# Patient Record
(demographics unavailable — no encounter records)

---

## 2025-03-25 NOTE — HISTORY OF PRESENT ILLNESS
[___ Month(s) Ago] : [unfilled] month(s) ago [FreeTextEntry1] : 3/2025: patient has no more fevers. initial flareup of joint pains is now improved; he has been on prednisone taper since hospital stay- currently on prednisone 40 mg QD. Ongoing pain in right hip and lower back. Has been off immunotherapy- states heme/onc will plan to resume once prednisone dose is 20 mg QD or below.

## 2025-03-25 NOTE — ASSESSMENT
[FreeTextEntry1] : 66y Male with hx HLD, Hypothyroidism, GERD,  with significant PMH of right renal carcinoma s/p robot-assisted right radical nephrectomy with bony metastatic disease, here for hospital followup.  Based on workup from inpatient stay in 2/2025, I do not suspect inflammatory arthritis such as RA; suspect joint pains both from bone metastases and from immunotherapy. will repeat labs today including ESR, CRP, ferritin which were markedly elevated;  there was a concern for HLH while inpatient- will check fibrinogen, triglycerides, Rafael-2A although suspicion is not high- patient did not have cytopenias, hepato/splenomegaly or significant transaminitis in the hospital.   Plan: Taper prednisone to 30 mg QD x 1 week, then 20 mg QD x 1 week. Per patient, hematology has immunotherapy on hold until prednisone reduced to 20 mg QD- He will follow up with me in 1-2 weeks to discuss results and see how he is feeling on lower steroid dose.

## 2025-03-25 NOTE — REASON FOR VISIT
[Post Hospitalization] : a post hospitalization visit [Follow-Up: _____] : a [unfilled] follow-up visit [FreeTextEntry1] : inflammation, bone/joint pain

## 2025-03-25 NOTE — DATA REVIEWED
[FreeTextEntry1] : CT abdomen/pelvis 2/2025 (hospitalization)- CT abd/pelvis: IMPRESSION: Increased size of retrocaval lymph node abutting the nephrectomy sutures and inferior vena cava, concerning for recurrent/metastatic disease. Hypodensity within the proximal inferior vena cava, favored to reflect mixing artifact, however local invasion cannot be excluded. Recommend further evaluation with contrast-enhanced MRI.   RUQ US: IMPRESSION: Limited evaluation due to overlying gas. No sonographic evidence of cholecystitis. Status post right nephrectomy.

## 2025-03-25 NOTE — REVIEW OF SYSTEMS
[As Noted in HPI] : as noted in HPI [Arthralgias] : arthralgias [Joint Swelling] : no joint swelling [Negative] : Heme/Lymph

## 2025-03-25 NOTE — PHYSICAL EXAM
[General Appearance - Alert] : alert [General Appearance - In No Acute Distress] : in no acute distress [General Appearance - Well Developed] : well developed [Sclera] : the sclera and conjunctiva were normal [Extraocular Movements] : extraocular movements were intact [Outer Ear] : the ears and nose were normal in appearance [Neck Appearance] : the appearance of the neck was normal [] : no respiratory distress [Exaggerated Use Of Accessory Muscles For Inspiration] : no accessory muscle use [Edema] : there was no peripheral edema [Musculoskeletal - Swelling] : no joint swelling seen [Skin Color & Pigmentation] : normal skin color and pigmentation [No Focal Deficits] : no focal deficits [Oriented To Time, Place, And Person] : oriented to person, place, and time [Mood] : the mood was normal [Affect] : the affect was normal

## 2025-04-01 NOTE — HISTORY OF PRESENT ILLNESS
[FreeTextEntry1] : 3/31/25 TEB visit: denies fevers, ocular inflammatory symptoms, rash, UTI or URI symptoms.  Arthralgias overall improved. He mainly has residual R. hip pain and mild R knee pain (Xrays showed R knee mild arthritis). He uses Pennsaid (topical voltaren gel) and that gives him relief (twice daily).

## 2025-04-01 NOTE — REASON FOR VISIT
[Home] : at home, [unfilled] , at the time of the visit. [Other Location: e.g. Home (Enter Location, City,State)___] : at [unfilled] [Telehealth (audio & video)] : This visit was provided via telehealth using real-time 2-way audio visual technology. [Verbal consent obtained from patient] : the patient, [unfilled] [FreeTextEntry1] : joint pain

## 2025-04-01 NOTE — ASSESSMENT
[FreeTextEntry1] : 66y Male with hx HLD, Hypothyroidism, GERD, with significant PMH of right renal carcinoma s/p robot-assisted right radical nephrectomy with bony metastatic disease, here for hospital followup.  Based on workup from inpatient stay in 2/2025, I do not suspect inflammatory arthritis such as RA; suspect joint pains both from bone metastases and from immunotherapy. suspect ESR is elevated in s etting of his renal cell carcinoma which requires treatment. His immunotherapy has been on hold recently. I do not suspect HLH at this time- his ferritin which was high previously has now returned to normal. He does not meet criteria for HLH (no significant hypertriglyceridemia, no transaminitis, no hepato/splenomegaly, no major cytopenias, etc.).   Plan: taper prednisone to 10 mg QD x 1 week, then 5 mg QD x 1 week, then discontinue altogether. He can follow up in 1 month.

## 2025-04-01 NOTE — PHYSICAL EXAM
[General Appearance - Alert] : alert [General Appearance - In No Acute Distress] : in no acute distress [General Appearance - Well Developed] : well developed [Sclera] : the sclera and conjunctiva were normal [Extraocular Movements] : extraocular movements were intact [Outer Ear] : the ears and nose were normal in appearance [Neck Appearance] : the appearance of the neck was normal [] : no respiratory distress [Exaggerated Use Of Accessory Muscles For Inspiration] : no accessory muscle use [Musculoskeletal - Swelling] : no joint swelling seen [Skin Color & Pigmentation] : normal skin color and pigmentation [No Focal Deficits] : no focal deficits [Oriented To Time, Place, And Person] : oriented to person, place, and time [Affect] : the affect was normal [Mood] : the mood was normal